# Patient Record
Sex: MALE | Race: WHITE
[De-identification: names, ages, dates, MRNs, and addresses within clinical notes are randomized per-mention and may not be internally consistent; named-entity substitution may affect disease eponyms.]

---

## 2018-08-07 ENCOUNTER — HOSPITAL ENCOUNTER (OUTPATIENT)
Dept: HOSPITAL 92 - TBSIIMAG | Age: 63
Discharge: HOME | End: 2018-08-07
Attending: NEUROLOGICAL SURGERY
Payer: COMMERCIAL

## 2018-08-07 DIAGNOSIS — M51.36: Primary | ICD-10-CM

## 2018-08-07 PROCEDURE — 72110 X-RAY EXAM L-2 SPINE 4/>VWS: CPT

## 2018-08-07 NOTE — RAD
LUMBAR SPINE THREE VIEWS:

 

HISTORY:

Intervertebral disk degeneration.  Low back pain.

 

COMPARISON:

None.

 

FINDINGS:

AP, lateral neutral, lateral flexion, and lateral extension views of the lumbar spine demonstrate fiv
e lumbar type vertebral bodies.  Vertebral body height is maintained.  Disk space height is preserved
.  Mild osteophyte formation.  No spondylolisthesis in the neutral position.  No spondylosis.  No abn
ormal motion upon flexion or extension.

 

IMPRESSION:

No significant degenerative change.

 

POS: ELAINA